# Patient Record
Sex: FEMALE | URBAN - METROPOLITAN AREA
[De-identification: names, ages, dates, MRNs, and addresses within clinical notes are randomized per-mention and may not be internally consistent; named-entity substitution may affect disease eponyms.]

---

## 2023-05-18 ENCOUNTER — APPOINTMENT (RX ONLY)
Dept: URBAN - METROPOLITAN AREA CLINIC 98 | Facility: CLINIC | Age: 48
Setting detail: DERMATOLOGY
End: 2023-05-18

## 2023-05-18 DIAGNOSIS — K13.0 DISEASES OF LIPS: ICD-10-CM | Status: INADEQUATELY CONTROLLED

## 2023-05-18 DIAGNOSIS — L82.0 INFLAMED SEBORRHEIC KERATOSIS: ICD-10-CM

## 2023-05-18 PROCEDURE — ? TREATMENT REGIMEN

## 2023-05-18 PROCEDURE — ? BENIGN DESTRUCTION

## 2023-05-18 PROCEDURE — 99204 OFFICE O/P NEW MOD 45 MIN: CPT | Mod: 25

## 2023-05-18 PROCEDURE — ? PHOTO-DOCUMENTATION

## 2023-05-18 PROCEDURE — ? COUNSELING

## 2023-05-18 PROCEDURE — ? PRESCRIPTION

## 2023-05-18 PROCEDURE — 17110 DESTRUCTION B9 LES UP TO 14: CPT

## 2023-05-18 PROCEDURE — ? DEFER

## 2023-05-18 RX ORDER — CLINDAMYCIN PHOSPHATE 10 MG/ML
LOTION TOPICAL
Qty: 60 | Refills: 1 | Status: ERX | COMMUNITY
Start: 2023-05-18

## 2023-05-18 RX ADMIN — CLINDAMYCIN PHOSPHATE: 10 LOTION TOPICAL at 00:00

## 2023-05-18 ASSESSMENT — LOCATION SIMPLE DESCRIPTION DERM
LOCATION SIMPLE: RIGHT CHEEK
LOCATION SIMPLE: RIGHT CHEEK
LOCATION SIMPLE: LEFT ANTERIOR NECK
LOCATION SIMPLE: LEFT EYEBROW
LOCATION SIMPLE: LEFT ANTERIOR NECK
LOCATION SIMPLE: LEFT CHEEK
LOCATION SIMPLE: LEFT EYEBROW

## 2023-05-18 ASSESSMENT — LOCATION DETAILED DESCRIPTION DERM
LOCATION DETAILED: LEFT INFERIOR MEDIAL MALAR CHEEK
LOCATION DETAILED: RIGHT SUPERIOR MEDIAL BUCCAL CHEEK
LOCATION DETAILED: LEFT CENTRAL EYEBROW
LOCATION DETAILED: LEFT INFERIOR LATERAL NECK
LOCATION DETAILED: LEFT INFERIOR LATERAL NECK
LOCATION DETAILED: RIGHT SUPERIOR CENTRAL MALAR CHEEK
LOCATION DETAILED: RIGHT SUPERIOR CENTRAL MALAR CHEEK
LOCATION DETAILED: LEFT CENTRAL EYEBROW

## 2023-05-18 ASSESSMENT — LOCATION ZONE DERM
LOCATION ZONE: FACE
LOCATION ZONE: NECK
LOCATION ZONE: NECK
LOCATION ZONE: FACE

## 2023-05-18 NOTE — PROCEDURE: DEFER
Size Of Lesion In Cm (Optional): 0
Procedure To Be Performed At Next Visit: Electrodesiccation
Introduction Text (Please End With A Colon): Codes to defer:
Detail Level: Detailed

## 2023-05-18 NOTE — PROCEDURE: BENIGN DESTRUCTION
Medical Necessity Clause: This procedure was medically necessary because the lesions that were treated were:
Medical Necessity Information: It is in your best interest to select a reason for this procedure from the list below. All of these items fulfill various CMS LCD requirements except the new and changing color options.
Consent: The patient's consent was obtained including but not limited to risks of crusting, scabbing, blistering, scarring, darker or lighter pigmentary change, recurrence, incomplete removal and infection.
Render Note In Bullet Format When Appropriate: No
Detail Level: Detailed
Treatment Number (Will Not Render If 0): 0
Anesthesia Volume In Cc: 0.5
Post-Care Instructions: I reviewed with the patient in detail post-care instructions. Patient is to wear sunprotection, and avoid picking at any of the treated lesions. Pt may apply Vaseline to crusted or scabbing areas.

## 2023-05-18 NOTE — HPI: OTHER
Condition:: Dry, cracked, irritated skin
Please Describe Your Condition:: Patient has dry, cracked, irritated skin on the corner on her lips for the about three weeks. She was given econazole 1% cream given by her primary care physician, she has been using it for 2 weeks and has not seen improvement. Her symptoms started after she accidentally nicked herself in the corners of her mouth while performing dermaplaning.

## 2023-05-18 NOTE — PROCEDURE: TREATMENT REGIMEN
Initiate Treatment: clindamycin 1 % lotion \\nSig: Apply to affected areas of the lips twice daily x 2-3 weeks.
Detail Level: Zone

## 2023-06-15 ENCOUNTER — APPOINTMENT (RX ONLY)
Dept: URBAN - METROPOLITAN AREA CLINIC 98 | Facility: CLINIC | Age: 48
Setting detail: DERMATOLOGY
End: 2023-06-15

## 2023-06-15 DIAGNOSIS — D18.0 HEMANGIOMA: ICD-10-CM

## 2023-06-15 DIAGNOSIS — L663 OTHER SPECIFIED DISEASES OF HAIR AND HAIR FOLLICLES: ICD-10-CM

## 2023-06-15 DIAGNOSIS — L73.9 FOLLICULAR DISORDER, UNSPECIFIED: ICD-10-CM

## 2023-06-15 DIAGNOSIS — Q819 OTHER SPECIFIED ANOMALIES OF SKIN: ICD-10-CM

## 2023-06-15 DIAGNOSIS — L21.8 OTHER SEBORRHEIC DERMATITIS: ICD-10-CM

## 2023-06-15 DIAGNOSIS — B35.3 TINEA PEDIS: ICD-10-CM | Status: INADEQUATELY CONTROLLED

## 2023-06-15 DIAGNOSIS — L738 OTHER SPECIFIED DISEASES OF HAIR AND HAIR FOLLICLES: ICD-10-CM

## 2023-06-15 DIAGNOSIS — Q828 OTHER SPECIFIED ANOMALIES OF SKIN: ICD-10-CM

## 2023-06-15 DIAGNOSIS — L81.0 POSTINFLAMMATORY HYPERPIGMENTATION: ICD-10-CM

## 2023-06-15 DIAGNOSIS — L82.0 INFLAMED SEBORRHEIC KERATOSIS: ICD-10-CM

## 2023-06-15 DIAGNOSIS — Q826 OTHER SPECIFIED ANOMALIES OF SKIN: ICD-10-CM

## 2023-06-15 DIAGNOSIS — D22 MELANOCYTIC NEVI: ICD-10-CM

## 2023-06-15 PROBLEM — D18.01 HEMANGIOMA OF SKIN AND SUBCUTANEOUS TISSUE: Status: ACTIVE | Noted: 2023-06-15

## 2023-06-15 PROBLEM — D22.9 MELANOCYTIC NEVI, UNSPECIFIED: Status: ACTIVE | Noted: 2023-06-15

## 2023-06-15 PROBLEM — L85.8 OTHER SPECIFIED EPIDERMAL THICKENING: Status: ACTIVE | Noted: 2023-06-15

## 2023-06-15 PROBLEM — L02.222 FURUNCLE OF BACK [ANY PART, EXCEPT BUTTOCK]: Status: ACTIVE | Noted: 2023-06-15

## 2023-06-15 PROCEDURE — 99214 OFFICE O/P EST MOD 30 MIN: CPT | Mod: 25

## 2023-06-15 PROCEDURE — ? TREATMENT REGIMEN

## 2023-06-15 PROCEDURE — ? COUNSELING

## 2023-06-15 PROCEDURE — ? PRESCRIPTION

## 2023-06-15 PROCEDURE — 17110 DESTRUCTION B9 LES UP TO 14: CPT

## 2023-06-15 PROCEDURE — ? LIQUID NITROGEN

## 2023-06-15 PROCEDURE — ? ELECTRODESICCATION

## 2023-06-15 RX ORDER — KETOCONAZOLE 20 MG/G
CREAM TOPICAL
Qty: 60 | Refills: 1 | Status: ERX | COMMUNITY
Start: 2023-06-15

## 2023-06-15 RX ORDER — TERBINAFINE HYDROCHLORIDE 1 G/100G
CREAM TOPICAL
Qty: 30 | Refills: 1 | Status: ERX | COMMUNITY
Start: 2023-06-15

## 2023-06-15 RX ORDER — KETOCONAZOLE 20 MG/ML
SHAMPOO, SUSPENSION TOPICAL
Qty: 120 | Refills: 2 | Status: ERX | COMMUNITY
Start: 2023-06-15

## 2023-06-15 RX ADMIN — TERBINAFINE HYDROCHLORIDE: 1 CREAM TOPICAL at 00:00

## 2023-06-15 RX ADMIN — KETOCONAZOLE: 20 CREAM TOPICAL at 00:00

## 2023-06-15 RX ADMIN — KETOCONAZOLE: 20 SHAMPOO, SUSPENSION TOPICAL at 00:00

## 2023-06-15 ASSESSMENT — LOCATION DETAILED DESCRIPTION DERM
LOCATION DETAILED: LEFT SUPERIOR LATERAL NECK
LOCATION DETAILED: INFERIOR THORACIC SPINE
LOCATION DETAILED: LEFT MEDIAL MALAR CHEEK
LOCATION DETAILED: RIGHT ANKLE
LOCATION DETAILED: RIGHT MEDIAL PLANTAR MIDFOOT
LOCATION DETAILED: RIGHT SUPERIOR MEDIAL MALAR CHEEK
LOCATION DETAILED: RIGHT MEDIAL FOREHEAD
LOCATION DETAILED: LEFT INFERIOR LATERAL NECK
LOCATION DETAILED: LEFT MEDIAL PLANTAR MIDFOOT
LOCATION DETAILED: LEFT SUPERIOR CENTRAL MALAR CHEEK
LOCATION DETAILED: LEFT MEDIAL DORSAL FOOT
LOCATION DETAILED: LEFT SUPERIOR ANTERIOR NECK

## 2023-06-15 ASSESSMENT — LOCATION SIMPLE DESCRIPTION DERM
LOCATION SIMPLE: RIGHT FOREHEAD
LOCATION SIMPLE: UPPER BACK
LOCATION SIMPLE: LEFT CHEEK
LOCATION SIMPLE: LEFT ANTERIOR NECK
LOCATION SIMPLE: RIGHT ANKLE
LOCATION SIMPLE: LEFT FOOT
LOCATION SIMPLE: RIGHT CHEEK
LOCATION SIMPLE: RIGHT PLANTAR SURFACE
LOCATION SIMPLE: LEFT PLANTAR SURFACE

## 2023-06-15 ASSESSMENT — LOCATION ZONE DERM
LOCATION ZONE: TRUNK
LOCATION ZONE: FACE
LOCATION ZONE: FEET
LOCATION ZONE: NECK
LOCATION ZONE: FACE
LOCATION ZONE: LEG

## 2023-06-15 NOTE — PROCEDURE: TREATMENT REGIMEN
Otc Regimen: treat shoes 2x/week x 6 months with anti-fungal powder
Detail Level: Zone
Initiate Treatment: ketoconazole 2 % topical cream \\nSig: Apply to both feet once daily in the morning. \\n\\nLamisil AT 1 % topical cream \\nSig: Apply to both feet at night once daily at bedtime.
Initiate Treatment: ketoconazole 2 % shampoo \\nSig: Wash scalp two times per week. Leave on for five minutes, then rinse.

## 2023-06-15 NOTE — PROCEDURE: ELECTRODESICCATION
Medical Necessity Information: It is in your best interest to select a reason for this procedure from the list below. All of these items fulfill various CMS LCD requirements except the new and changing color options.
Anesthesia Type: 1% lidocaine with epinephrine
Medical Necessity Clause: This procedure was medically necessary because the lesions that were treated were:
Post-Care Instructions: I reviewed with the patient in detail post-care instructions. Patient is to wear sunprotection, and avoid picking at any of the treated lesions.  Pt may apply Vaseline to crusted or scabbing areas
Consent: The patient's verbal consent was obtained including but not limited to risks of crusting, scabbing, blistering, scarring, darker or lighter pigmentary change, recurrence, incomplete removal and infection.
Detail Level: Simple
Include Z78.9 (Other Specified Conditions Influencing Health Status) As An Associated Diagnosis?: No

## 2023-06-15 NOTE — PROCEDURE: LIQUID NITROGEN
Show Applicator Variable?: Yes
Include Z78.9 (Other Specified Conditions Influencing Health Status) As An Associated Diagnosis?: No
Detail Level: Detailed
Post-Care Instructions: I reviewed with the patient in detail post-care instructions. Patient is to wear sunprotection, and avoid picking at any of the treated lesions. Pt may apply Vaseline to crusted or scabbing areas.
Medical Necessity Information: It is in your best interest to select a reason for this procedure from the list below. All of these items fulfill various CMS LCD requirements except the new and changing color options.
Consent: The patient's consent was obtained including but not limited to risks of crusting, scabbing, blistering, scarring, darker or lighter pigmentary change, recurrence, incomplete removal and infection.
Spray Paint Text: The liquid nitrogen was applied to the skin utilizing a spray paint frosting technique.
Medical Necessity Clause: This procedure was medically necessary because the lesions that were treated were:

## 2023-06-15 NOTE — PROCEDURE: COUNSELING
Detail Level: Detailed
Detail Level: Simple
Topical Keratolytics Recommendations: OTC salicylic acid 2% body wash